# Patient Record
Sex: MALE | Race: WHITE | NOT HISPANIC OR LATINO | Employment: OTHER | URBAN - METROPOLITAN AREA
[De-identification: names, ages, dates, MRNs, and addresses within clinical notes are randomized per-mention and may not be internally consistent; named-entity substitution may affect disease eponyms.]

---

## 2019-01-03 ENCOUNTER — HOSPITAL ENCOUNTER (EMERGENCY)
Facility: OTHER | Age: 64
Discharge: HOME | End: 2019-01-03
Attending: EMERGENCY MEDICINE
Payer: MEDICARE

## 2019-01-03 ENCOUNTER — NURSE TRIAGE (OUTPATIENT)
Dept: ADMINISTRATIVE | Facility: CLINIC | Age: 64
End: 2019-01-03

## 2019-01-03 VITALS
WEIGHT: 249.31 LBS | HEART RATE: 86 BPM | HEIGHT: 72 IN | BODY MASS INDEX: 33.77 KG/M2 | SYSTOLIC BLOOD PRESSURE: 141 MMHG | OXYGEN SATURATION: 95 % | TEMPERATURE: 98 F | RESPIRATION RATE: 18 BRPM | DIASTOLIC BLOOD PRESSURE: 65 MMHG

## 2019-01-03 DIAGNOSIS — R05.9 COUGH: ICD-10-CM

## 2019-01-03 DIAGNOSIS — J11.1 INFLUENZA: Primary | ICD-10-CM

## 2019-01-03 LAB
INFLUENZA A, MOLECULAR: POSITIVE
INFLUENZA B, MOLECULAR: NEGATIVE
SPECIMEN SOURCE: ABNORMAL

## 2019-01-03 PROCEDURE — 99284 EMERGENCY DEPT VISIT MOD MDM: CPT | Mod: 25

## 2019-01-03 PROCEDURE — 87502 INFLUENZA DNA AMP PROBE: CPT

## 2019-01-03 PROCEDURE — 25000003 PHARM REV CODE 250: Performed by: EMERGENCY MEDICINE

## 2019-01-03 RX ORDER — OSELTAMIVIR PHOSPHATE 75 MG/1
75 CAPSULE ORAL
Status: COMPLETED | OUTPATIENT
Start: 2019-01-03 | End: 2019-01-03

## 2019-01-03 RX ORDER — OSELTAMIVIR PHOSPHATE 75 MG/1
75 CAPSULE ORAL 2 TIMES DAILY
Qty: 10 CAPSULE | Refills: 0 | Status: SHIPPED | OUTPATIENT
Start: 2019-01-03 | End: 2019-01-08

## 2019-01-03 RX ORDER — ACETAMINOPHEN 500 MG
1000 TABLET ORAL
Status: COMPLETED | OUTPATIENT
Start: 2019-01-03 | End: 2019-01-03

## 2019-01-03 RX ADMIN — ACETAMINOPHEN 1000 MG: 500 TABLET ORAL at 08:01

## 2019-01-03 RX ADMIN — OSELTAMIVIR PHOSPHATE 75 MG: 75 CAPSULE ORAL at 08:01

## 2019-01-04 NOTE — DISCHARGE INSTRUCTIONS
We have prescribed you tamiflu. Please fill and take as directed.    Please return to the ER if you have chest pain, difficulty breathing, fevers, altered mental status, dizziness, weakness, or any other concerns.      Follow up with your primary care physician.

## 2019-01-04 NOTE — ED TRIAGE NOTES
Pt reports cough and congestion x 5 days worsening x 2 days. Max temp at home 103. Reports generalized body aches and headache.  Report cough is productive with beige/yellow sputum.

## 2019-01-04 NOTE — ED PROVIDER NOTES
"Encounter Date: 1/3/2019    SCRIBE #1 NOTE: I, Memekatelynn Gunderson, am scribing for, and in the presence of, Dr. Green.       History     Chief Complaint   Patient presents with    Fever     w/ productive cough of yellow sputum, has had a cold x 5 days     Time seen by provider: 8:37 PM    This is a 63 y.o. male, with history of multiple sclerosis, HTN, and HLD, who presents with complaint of fever last night.  His wife reports that his fever was above "103" last night. He reports productive cough, congestion, and nausea.  He denies chills, sore throat, SOB, chest pain, vomiting, diarrhea, and weakness.  Her reports that he has not eaten much today.  He reports that he started feeling sick four days ago while travelling to [place] from [place], but significantly worsened yesterday.  He reports taking dayquil. He reports that he took 600 mg of ibuprofen five hour ago and aspirin four hours ago.  He denies taking tylenol.  He reports that he takes tegretol regularly.  He reports getting his flu shot.  He is not taking any medications for his MS at this time.      The history is provided by the patient.     Review of patient's allergies indicates:  No Known Allergies  Past Medical History:   Diagnosis Date    Hypercholesteremia     Hypertension     Multiple sclerosis      Past Surgical History:   Procedure Laterality Date    CYST REMOVAL      THYROIDECTOMY       History reviewed. No pertinent family history.  Social History     Tobacco Use    Smoking status: Never Smoker    Smokeless tobacco: Never Used   Substance Use Topics    Alcohol use: Yes    Drug use: No     Review of Systems   Constitutional: Positive for appetite change and fever. Negative for chills.   HENT: Positive for congestion. Negative for sore throat.    Respiratory: Positive for cough. Negative for shortness of breath.    Cardiovascular: Negative for chest pain.   Gastrointestinal: Positive for nausea. Negative for abdominal pain, diarrhea " and vomiting.   Genitourinary: Negative for dysuria and frequency.   Musculoskeletal: Negative for back pain and neck pain.   Skin: Negative for color change, pallor and rash.   Neurological: Negative for dizziness, weakness and headaches.   Hematological: Negative for adenopathy. Does not bruise/bleed easily.       Physical Exam     Initial Vitals [01/03/19 1906]   BP Pulse Resp Temp SpO2   136/64 81 20 (!) 102.3 °F (39.1 °C) (!) 94 %      MAP       --         Physical Exam    Nursing note and vitals reviewed.  Constitutional: He appears well-developed and well-nourished. He is not diaphoretic. No distress.   HENT:   Head: Normocephalic and atraumatic.   Mouth/Throat: No oropharyngeal exudate.   Eyes: Conjunctivae and EOM are normal. No scleral icterus.   Neck: Normal range of motion. Neck supple.   Cardiovascular: Normal rate, regular rhythm and normal heart sounds.   Pulmonary/Chest: Breath sounds normal. No respiratory distress. He has no wheezes. He has no rhonchi. He has no rales.   Musculoskeletal: Normal range of motion. He exhibits no edema or tenderness.   Neurological: He is alert and oriented to person, place, and time.   Skin: Skin is warm and dry. No rash noted. No erythema. No pallor.   Psychiatric: He has a normal mood and affect. His behavior is normal. Judgment and thought content normal.         ED Course   Procedures  Labs Reviewed   INFLUENZA A & B BY MOLECULAR - Abnormal; Notable for the following components:       Result Value    Influenza A, Molecular Positive (*)     All other components within normal limits          Imaging Results          X-Ray Chest 1 View (Final result)  Result time 01/03/19 19:35:18    Final result by Carlos Manuel Marr MD (01/03/19 19:35:18)                 Impression:      No radiographic acute intrathoracic process seen on this single view.  Specifically, no focal consolidation.      Electronically signed by: Carlos Manuel Marr MD  Date:    01/03/2019  Time:    19:35              Narrative:    EXAMINATION:  XR CHEST 1 VIEW    CLINICAL HISTORY:  Cough    TECHNIQUE:  Single frontal view of the chest was performed.    COMPARISON:  None    FINDINGS:  Cardiomediastinal silhouette is midline and within normal limits for age allowing for magnification by chest wall and slight patient rotation.  Pulmonary vasculature and hilar regions are within normal limits.  Bibasilar linear opacities suggesting subsegmental scarring versus atelectasis.  The lungs are otherwise well expanded without large consolidation, pleural effusion or pneumothorax.  No acute osseous process seen.  PA and lateral views can be obtained.                              X-Rays:   Independently Interpreted Readings:   Chest X-Ray: Trachea midline.  No cardiomegaly.  No effusion, infiltrate, edema.     Medical Decision Making:   History:   Old Medical Records: I decided to obtain old medical records.  Old Records Summarized: other records.  Initial Assessment:   8:37PM:  Patient is a 63-year-old male who presents to the emergency department with cough, congestion, body aches, fever.  Patient appears well, nontoxic.  Chest x-ray from triage is negative for any acute findings.  However his flu test was positive for flu A.  He is febrile here.  I did discuss with him the benefits and risks of Tamiflu at this point.  Patient would like to go ahead and start Tamiflu.  Will plan to give him his 1st dose of Tamiflu here along with 1g Tylenol.  He is flying back to [place] tomorrow.  I updated the patient regarding results and I counseled the patient regarding supportive care measures.  I have discussed with the pt ED return warnings and need for close PCP f/u.  Pt agreeable to plan and all questions answered.  I feel that pt is stable for discharge and management as an outpatient and no further intervention is needed at this time.  Pt is comfortable returning to the ED if needed.  Will DC home in stable condition.      Independently  Interpreted Test(s):   I have ordered and independently interpreted X-rays - see prior notes.  Clinical Tests:   Lab Tests: Ordered and Reviewed  Radiological Study: Ordered and Reviewed            Scribe Attestation:   Scribe #1: I performed the above scribed service and the documentation accurately describes the services I performed. I attest to the accuracy of the note.    Attending Attestation:           Physician Attestation for Scribe:  Physician Attestation Statement for Scribe #1: I, Dr. Green, reviewed documentation, as scribed by Yeison Gunderson in my presence, and it is both accurate and complete.                    Clinical Impression:     1. Influenza    2. Cough                                   Jazmine Green MD  01/03/19 [id number]

## 2019-01-04 NOTE — TELEPHONE ENCOUNTER
"    Reason for Disposition   Fever > 103 F (39.4 C)    Answer Assessment - Initial Assessment Questions  1. ONSET: "When did the cough begin?"       Yesterday   2. SEVERITY: "How bad is the cough today?"       Frequent cough- worse today than yesterday  3. RESPIRATORY DISTRESS: "Describe your breathing."       None   4. FEVER: "Do you have a fever?" If so, ask: "What is your temperature, how was it measured, and when did it start?"      103.2 po at about 1745  5. SPUTUM: "Describe the color of your sputum" (clear, white, yellow, green)      Thick yellow  6. HEMOPTYSIS: "Are you coughing up any blood?" If so ask: "How much?" (flecks, streaks, tablespoons, etc.)      None   7. CARDIAC HISTORY: "Do you have any history of heart disease?" (e.g., heart attack, congestive heart failure)       no  8. LUNG HISTORY: "Do you have any history of lung disease?"  (e.g., pulmonary embolus, asthma, emphysema)      no  9. PE RISK FACTORS: "Do you have a history of blood clots?" (or: recent major surgery, recent prolonged travel, bedridden )      no  10. OTHER SYMPTOMS: "Do you have any other symptoms?" (e.g., runny nose, wheezing, chest pain)        Pain with cough  11. PREGNANCY: "Is there any chance you are pregnant?" "When was your last menstrual period?"          12. TRAVEL: "Have you traveled out of the country in the last month?" (e.g., travel history, exposures)        No  Wife calling for pt. Pt has MS but no exacerbations - they are visiting from out of town and are planning on leaving tomorrow.    Protocols used: ST COUGH - ACUTE [id number]      "

## 2019-01-04 NOTE — ED NOTES
PO FLUIDS:  Pt given water 500 ml's to consume po. Pt encouraged to complete fluids as tolerated. Pt verbalized understanding. Will continue to monitor.